# Patient Record
Sex: MALE | Race: WHITE | Employment: FULL TIME | URBAN - METROPOLITAN AREA
[De-identification: names, ages, dates, MRNs, and addresses within clinical notes are randomized per-mention and may not be internally consistent; named-entity substitution may affect disease eponyms.]

---

## 2019-09-17 ENCOUNTER — OFFICE VISIT (OUTPATIENT)
Dept: ENDOCRINOLOGY | Age: 49
End: 2019-09-17
Payer: COMMERCIAL

## 2019-09-17 VITALS
DIASTOLIC BLOOD PRESSURE: 82 MMHG | RESPIRATION RATE: 16 BRPM | OXYGEN SATURATION: 98 % | WEIGHT: 257.2 LBS | HEIGHT: 68 IN | BODY MASS INDEX: 38.98 KG/M2 | HEART RATE: 68 BPM | SYSTOLIC BLOOD PRESSURE: 128 MMHG

## 2019-09-17 DIAGNOSIS — Z86.39 H/O DIABETES MELLITUS: Primary | ICD-10-CM

## 2019-09-17 DIAGNOSIS — R73.03 PREDIABETES: ICD-10-CM

## 2019-09-17 LAB — HBA1C MFR BLD: 6.3 %

## 2019-09-17 PROCEDURE — 99204 OFFICE O/P NEW MOD 45 MIN: CPT | Performed by: INTERNAL MEDICINE

## 2019-09-17 PROCEDURE — 83036 HEMOGLOBIN GLYCOSYLATED A1C: CPT | Performed by: INTERNAL MEDICINE

## 2019-09-17 RX ORDER — ATORVASTATIN CALCIUM 40 MG/1
40 TABLET, FILM COATED ORAL DAILY
COMMUNITY

## 2019-09-17 SDOH — HEALTH STABILITY: PHYSICAL HEALTH: ON AVERAGE, HOW MANY MINUTES DO YOU ENGAGE IN EXERCISE AT THIS LEVEL?: 30 MIN

## 2019-09-17 SDOH — HEALTH STABILITY: PHYSICAL HEALTH: ON AVERAGE, HOW MANY DAYS PER WEEK DO YOU ENGAGE IN MODERATE TO STRENUOUS EXERCISE (LIKE A BRISK WALK)?: 6 DAYS

## 2019-09-17 NOTE — PROGRESS NOTES
Nephropathy or Neuropathy   Macrovascular complications: history of CAD s/p stent placement in August/2018, no PVD or Stroke  The patient has not received Flushot or Pneumonia vaccination      PAST MEDICAL HISTORY   Past Medical History:   Diagnosis Date    CAD S/P percutaneous coronary angioplasty     Hyperlipidemia     Hypertension     Type 2 diabetes mellitus ([facility] [address]      PAST SURGICAL HISTORY   Past Surgical History:   Procedure Laterality Date    APPENDECTOMY  1993     SOCIAL HISTORY   Social History     Socioeconomic History    Marital status:      Spouse name: Not on file    Number of children: Not on file    Years of education: Not on file    Highest education level: Not on file   Occupational History    Not on file   Social Needs    Financial resource strain: Not on file    Food insecurity:     Worry: Not on file     Inability: Not on file    Transportation needs:     Medical: Not on file     Non-medical: Not on file   Tobacco Use    Smoking status: Never Smoker    Smokeless tobacco: Never Used   Substance and Sexual Activity    Alcohol use:  Yes     Alcohol/week: 0.0 standard drinks     Comment: occassionally    Drug use: No    Sexual activity: Not on file   Lifestyle    Physical activity:     Days per week: 6 days     Minutes per session: 30 min    Stress: Not on file   Relationships    Social connections:     Talks on phone: Not on file     Gets together: Not on file     Attends [employer] service: Not on file     Active member of club or organization: Not on file     Attends meetings of clubs or organizations: Not on file     Relationship status: Not on file    Intimate partner violence:     Fear of current or ex partner: Not on file     Emotionally abused: Not on file     Physically abused: Not on file     Forced sexual activity: Not on file   Other Topics Concern    Not on file   Social History Narrative    Not on file     FAMILY HISTORY   Family History   Problem Relation male seen in for the following issues     Diabetes Mellitus Type 2     · Patient's diabetes is controlled   · Will continue lifestyle modification   · The patient was advised to check blood sugars 4 times a day before meals and at bedtime and send BS readings to our office in a week. · Discussed with patient A1c and blood sugar goals   · Optimal blood sugars: 100-140 pre-prandial, < 180 peak post-prandial  · The patient counseled about the complications of uncontrolled diabetes   · Patient was counselled about the importance of self-blood glucose monitoring and eating consistent carb diet to avoid blood sugar fluctuations   · Patient will need routine diabetes maintenance and prevention  · The patient was referred to diabetic educator for further teaching   · Discussed lifestyle changes including diet and exercise with patient; recommended 150 minutes of moderate intensity exercise per week. · Diabetes labs before next visit       Obesity   Discussed lifestyle changes including diet and exercise with patient in depth. Also discussed with patient cardiovascular risk associated with obesity    Return in about 4 months (around 1/17/2020) for Prediabetes . The above issues were reviewed with the patient who understood and agreed with the plan. Thank you for allowing us to participate in the care of this patient. Please do not hesitate to contact us with any additional questions.      Chase Infante MD PGY-2  Internal medicine    Attending:  Anna Gutierrez MD  Endocrinologist, [facility] and Endocrinology   [address] [place] [address]   Phone: [phone]  Fax: [phone]  --------------------------------------------  Electronically signed by Sujata Santillan MD

## 2019-10-28 ENCOUNTER — HOSPITAL ENCOUNTER (OUTPATIENT)
Dept: DIABETES SERVICES | Age: 49
Setting detail: THERAPIES SERIES
Discharge: HOME | End: 2019-10-28
Payer: COMMERCIAL

## 2019-10-28 PROCEDURE — G0109 DIAB MANAGE TRN IND/GROUP: HCPCS

## 2019-10-29 ENCOUNTER — HOSPITAL ENCOUNTER (OUTPATIENT)
Dept: DIABETES SERVICES | Age: 49
Setting detail: THERAPIES SERIES
Discharge: HOME | End: 2019-10-29
Payer: COMMERCIAL

## 2019-10-29 PROCEDURE — G0109 DIAB MANAGE TRN IND/GROUP: HCPCS | Performed by: DIETITIAN, REGISTERED

## 2019-10-30 SDOH — ECONOMIC STABILITY: FOOD INSECURITY: ADDITIONAL INFORMATION: NO

## 2019-10-30 ASSESSMENT — PATIENT HEALTH QUESTIONNAIRE - PHQ9
SUM OF ALL RESPONSES TO PHQ QUESTIONS 1-9: 4
1. LITTLE INTEREST OR PLEASURE IN DOING THINGS: 1
SUM OF ALL RESPONSES TO PHQ9 QUESTIONS 1 & 2: 2
SUM OF ALL RESPONSES TO PHQ QUESTIONS 1-9: 4
2. FEELING DOWN, DEPRESSED OR HOPELESS: 1

## 2020-07-19 ENCOUNTER — APPOINTMENT (OUTPATIENT)
Dept: GENERAL RADIOLOGY | Age: 50
End: 2020-07-19
Payer: COMMERCIAL

## 2020-07-19 ENCOUNTER — HOSPITAL ENCOUNTER (EMERGENCY)
Age: 50
Discharge: HOME | End: 2020-07-19
Payer: COMMERCIAL

## 2020-07-19 VITALS
HEART RATE: 80 BPM | DIASTOLIC BLOOD PRESSURE: 87 MMHG | TEMPERATURE: 97.9 F | BODY MASS INDEX: 40.16 KG/M2 | RESPIRATION RATE: 14 BRPM | OXYGEN SATURATION: 96 % | SYSTOLIC BLOOD PRESSURE: 153 MMHG | HEIGHT: 68 IN | WEIGHT: 265 LBS

## 2020-07-19 PROCEDURE — 99283 EMERGENCY DEPT VISIT LOW MDM: CPT

## 2020-07-19 PROCEDURE — 6370000000 HC RX 637 (ALT 250 FOR IP): Performed by: PHYSICIAN ASSISTANT

## 2020-07-19 PROCEDURE — 73610 X-RAY EXAM OF ANKLE: CPT

## 2020-07-19 RX ORDER — IBUPROFEN 800 MG/1
800 TABLET ORAL ONCE
Status: COMPLETED | OUTPATIENT
Start: 2020-07-19 | End: 2020-07-19

## 2020-07-19 RX ADMIN — IBUPROFEN 800 MG: 800 TABLET, FILM COATED ORAL at 19:12

## 2020-07-19 ASSESSMENT — PAIN DESCRIPTION - DESCRIPTORS: DESCRIPTORS: CONSTANT;NUMBNESS

## 2020-07-19 ASSESSMENT — PAIN DESCRIPTION - ORIENTATION: ORIENTATION: RIGHT

## 2020-07-19 ASSESSMENT — PAIN SCALES - GENERAL
PAINLEVEL_OUTOF10: 8
PAINLEVEL_OUTOF10: 8

## 2020-07-19 ASSESSMENT — PAIN DESCRIPTION - FREQUENCY: FREQUENCY: CONTINUOUS

## 2020-07-19 ASSESSMENT — PAIN DESCRIPTION - PAIN TYPE: TYPE: ACUTE PAIN

## 2020-07-19 ASSESSMENT — PAIN DESCRIPTION - LOCATION: LOCATION: ANKLE

## 2020-07-20 NOTE — ED PROVIDER NOTES
[facility]       Department of Emergency Medicine   ED  Provider Note  Admit Date/RoomTime: 7/19/2020  6:49 PM  ED Room: 33/33  Chief Complaint:   Ankle Pain (Right ankle. Pt states was playing disc golf and rolled ankle, causing fall. [place] crack. Denies head injury, thinners. Pt reports left hamstring pain.)    History of Present Illness   Source of history provided by:  patient. History/Exam Limitations: none. Michael Becerril is a 47 y.o. old male presenting to the emergency department by private vehicle, for Right ankle pain which occured 2 hour(s) prior to arrival.  Cause of complaint: sports injury, twisting injury while playing disc golf. There has been a history of no prior problems with this area in the past.  Since onset the symptoms have been moderate in degree with ability to bear weight, but with some pain. His pain is aggraveated by standing, walking and relieved by OTC NSAIDS and rest.  Tetanus Status: up to date. Patient reports he was doing his disc throw when he thought he tore his left hamstring which caused him to kind of leap up in pain and when he did this he twisted his right ankle. ROS    Pertinent positives and negatives are stated within HPI, all other systems reviewed and are negative. Past Surgical History:   Procedure Laterality Date    APPENDECTOMY  1993   Social History:  reports that he has never smoked. He has never used smokeless tobacco. He reports current alcohol use. He reports that he does not use drugs. Family History: family history includes Kidney Disease in his father; Barnetta Soren in his brother; Other in his father and sister; Thyroid Disease in his sister. Allergies: Patient has no known allergies.     Physical Exam           ED Triage Vitals   BP Temp Temp src Pulse Resp SpO2 Height Weight   07/19/20 1848 07/19/20 1843 -- 07/19/20 1843 07/19/20 1843 07/19/20 1843 07/19/20 1841 07/19/20 1841   (!) 153/87 97.9 °F (36.6 °C)  80 14 96 % 5' 8\" (1.727 m) 265 lb (120.2 kg)       Oxygen Saturation Interpretation: Normal.    Constitutional:  Alert, development consistent with age. Neck:  Normal ROM. Supple. Ankle:  Right Lateral:              Tenderness:  moderate. Swelling: Moderate. Deformity: no.             ROM: full range with pain. Skin:  no erythema, rash or wounds noted. Neurovascular: Motor deficit: none. Sensory deficit:   none. Pulse deficit: none. Capillary refill: normal.  Knee:              Tenderness:  none. Swelling: None. Deformity: no.             ROM: full range of motion. Skin:  no erythema, rash or wounds noted. Foot:              Tenderness:  none. Swelling: None. Deformity: no.             ROM: full range of motion. Skin:  no erythema, rash or wounds noted. Gait:  limp. Lymphatics: No lymphangitis or adenopathy noted. Neurological:  Oriented. Motor functions intact. Lab / Imaging Results   (All laboratory and radiology results have been personally reviewed by myself)  Labs:  No results found for this visit on 07/19/20. Imaging: All Radiology results interpreted by Radiologist unless otherwise noted. XR ANKLE RIGHT (MIN 3 VIEWS)   Final Result      No fracture or joint dislocation. ED Course / Medical Decision Making     Medications   ibuprofen (ADVIL;MOTRIN) tablet 800 mg (800 mg Oral Given 7/19/20 1912)        Consults:   None    Procedure(s):   none    MDM:       Films were obtained based on moderate suspicion for bony injury as per history/physical findings . Plan is subsequently for symptom control, limited use and  immobilization with appropriate outpatient follow-up. Patient advised to rest and ice and ibuprofen also for the torn hamstring. Follow-up with primary care as needed.   Patient is on vacation this week so he does not need a work note.    Counseling: The emergency provider has spoken with the patient and discussed todays results, in addition to providing specific details for the plan of care and counseling regarding the diagnosis and prognosis. Questions are answered at this time and they are agreeable with the plan. Assessment      1. Sprain of right ankle, unspecified ligament, initial encounter      Plan   Discharge to home  Patient condition is good    New Medications     Discharge Medication List as of 7/19/2020  8:05 PM        Electronically signed by Otoniel Pollard PA-C   DD: 7/20/20  **This report was transcribed using voice recognition software. Every effort was made to ensure accuracy; however, inadvertent computerized transcription errors may be present.   END OF ED PROVIDER NOTE       Otoniel Pollard PA-C  07/20/20 [id number]

## 2022-03-28 ENCOUNTER — OFFICE VISIT (OUTPATIENT)
Dept: ENDOCRINOLOGY | Age: 52
End: 2022-03-28
Payer: COMMERCIAL

## 2022-03-28 VITALS
BODY MASS INDEX: 39.71 KG/M2 | HEIGHT: 68 IN | HEART RATE: 61 BPM | WEIGHT: 262 LBS | DIASTOLIC BLOOD PRESSURE: 81 MMHG | SYSTOLIC BLOOD PRESSURE: 125 MMHG

## 2022-03-28 DIAGNOSIS — E78.2 MIXED HYPERLIPIDEMIA: ICD-10-CM

## 2022-03-28 DIAGNOSIS — E11.65 UNCONTROLLED TYPE 2 DIABETES MELLITUS WITH HYPERGLYCEMIA (HCC): Primary | ICD-10-CM

## 2022-03-28 DIAGNOSIS — E66.01 CLASS 2 SEVERE OBESITY DUE TO EXCESS CALORIES WITH SERIOUS COMORBIDITY AND BODY MASS INDEX (BMI) OF 39.0 TO 39.9 IN ADULT (HCC): ICD-10-CM

## 2022-03-28 DIAGNOSIS — E11.65 UNCONTROLLED TYPE 2 DIABETES MELLITUS WITH HYPERGLYCEMIA (HCC): ICD-10-CM

## 2022-03-28 DIAGNOSIS — E55.9 VITAMIN D DEFICIENCY: ICD-10-CM

## 2022-03-28 LAB
ALBUMIN SERPL-MCNC: 4.8 G/DL (ref 3.5–5.2)
ALP BLD-CCNC: 134 U/L (ref 40–129)
ALT SERPL-CCNC: 47 U/L (ref 0–40)
ANION GAP SERPL CALCULATED.3IONS-SCNC: 16 MMOL/L (ref 7–16)
AST SERPL-CCNC: 27 U/L (ref 0–39)
BILIRUB SERPL-MCNC: 0.6 MG/DL (ref 0–1.2)
BUN BLDV-MCNC: 12 MG/DL (ref 6–20)
CALCIUM SERPL-MCNC: 9.7 MG/DL (ref 8.6–10.2)
CHLORIDE BLD-SCNC: 102 MMOL/L (ref 98–107)
CHOLESTEROL, TOTAL: 177 MG/DL (ref 0–199)
CO2: 23 MMOL/L (ref 22–29)
CREAT SERPL-MCNC: 0.8 MG/DL (ref 0.7–1.2)
CREATININE URINE: 114 MG/DL (ref 40–278)
GFR AFRICAN AMERICAN: >60
GFR NON-AFRICAN AMERICAN: >60 ML/MIN/1.73
GLUCOSE BLD-MCNC: 255 MG/DL (ref 74–99)
HBA1C MFR BLD: 9.2 %
HDLC SERPL-MCNC: 46 MG/DL
LDL CHOLESTEROL CALCULATED: 92 MG/DL (ref 0–99)
MICROALBUMIN UR-MCNC: 13.8 MG/L
MICROALBUMIN/CREAT UR-RTO: 12.1 (ref 0–30)
POTASSIUM SERPL-SCNC: 4.7 MMOL/L (ref 3.5–5)
SODIUM BLD-SCNC: 141 MMOL/L (ref 132–146)
TOTAL PROTEIN: 8 G/DL (ref 6.4–8.3)
TRIGL SERPL-MCNC: 197 MG/DL (ref 0–149)
TSH SERPL DL<=0.05 MIU/L-ACNC: 2.36 UIU/ML (ref 0.27–4.2)
VITAMIN D 25-HYDROXY: 19 NG/ML (ref 30–100)
VLDLC SERPL CALC-MCNC: 39 MG/DL

## 2022-03-28 PROCEDURE — 99214 OFFICE O/P EST MOD 30 MIN: CPT | Performed by: CLINICAL NURSE SPECIALIST

## 2022-03-28 PROCEDURE — 83036 HEMOGLOBIN GLYCOSYLATED A1C: CPT | Performed by: CLINICAL NURSE SPECIALIST

## 2022-03-28 PROCEDURE — 3046F HEMOGLOBIN A1C LEVEL >9.0%: CPT | Performed by: CLINICAL NURSE SPECIALIST

## 2022-03-28 RX ORDER — BLOOD SUGAR DIAGNOSTIC
1 STRIP MISCELLANEOUS DAILY
Qty: 100 EACH | Refills: 3 | Status: SHIPPED | OUTPATIENT
Start: 2022-03-28

## 2022-03-28 RX ORDER — LISINOPRIL 10 MG/1
TABLET ORAL
COMMUNITY
Start: 2022-02-01

## 2022-03-28 RX ORDER — SEMAGLUTIDE 1.34 MG/ML
INJECTION, SOLUTION SUBCUTANEOUS
Qty: 3 PEN | Refills: 3 | Status: SHIPPED | OUTPATIENT
Start: 2022-03-28

## 2022-03-28 RX ORDER — METOPROLOL SUCCINATE 25 MG/1
TABLET, EXTENDED RELEASE ORAL
COMMUNITY
Start: 2022-01-24

## 2022-03-28 RX ORDER — LANCETS
EACH MISCELLANEOUS
Qty: 100 EACH | Refills: 3 | Status: SHIPPED | OUTPATIENT
Start: 2022-03-28

## 2022-03-28 NOTE — PROGRESS NOTES
[address] Department of Endocrinology Diabetes and Metabolism   [address] [facility] [address]   Phone: [phone]  Fax: [phone]    Date of Service: 3/28/2022    Primary Care Physician: Flavia Friday, DO  Referring physician: Vannesa Alba DO  Provider: Joel York APRN - CNS     Reason for the visit:      History of Present Illness: The history is provided by the patient. No  was used. Accuracy of the patient data is excellent. Kristopher Cline is a very pleasant 45 y.o. male seen today for diabetes management     Dx with dm in 2017   Context: Dx on BW   Medications :  Was on metformin but caused pt to feel  lethargic   Has not been on any DM medication since that time   He was doing well with diet but hba1c has recently worsened   Has not checked BG recently     Lab Results   Component Value Date    LABA1C 9.2 03/28/2022    LABA1C 6.3 09/17/2019       The patient has been mindful of what has been eating and following diabetic diet as encouraged  I reviewed current medications and the patient is currently not on any diabetic medication. The patient is due for an eye exam. Last eye exam was a few years ago no h/o diabetic retinopathy. The patient has not seen any podiatrist in the past.   Microvascular complications:  No Retinopathy, Nephropathy or Neuropathy   Macrovascular complications: history of CAD s/p stent placement in August/2018, no PVD or Stroke  The patient has not received Flushot or Pneumonia vaccination  PAST MEDICAL HISTORY   Past Medical History:   Diagnosis Date    CAD S/P percutaneous coronary angioplasty     Hyperlipidemia     Hypertension     Type 2 diabetes mellitus ([address]        PAST SURGICAL HISTORY   Past Surgical History:   Procedure Laterality Date    APPENDECTOMY  1993       SOCIAL HISTORY   Tobacco:   reports that he has never smoked.  He has never used smokeless tobacco.  Alcohol:   reports current alcohol use.  Drugs:   reports no history of drug use. FAMILY HISTORY   Family History   Problem Relation Age of Onset    Other Father         leukemia,     Kidney Disease Father     Lung Cancer Brother     Other Sister         chrohns disease    Thyroid Disease Sister        ALLERGIES AND DRUG REACTIONS   No Known Allergies    CURRENT MEDICATIONS   Current Outpatient Medications   Medication Sig Dispense Refill    lisinopril (PRINIVIL;ZESTRIL) 10 MG tablet TAKE 1 TABLET BY MOUTH EVERY DAY      metoprolol succinate (TOPROL XL) 25 MG extended release tablet TAKE 1 TABLET BY MOUTH EVERY DAY      Semaglutide,0.25 or 0.5MG/DOS, (OZEMPIC, 0.25 OR 0.5 MG/DOSE,) 2 MG/1.5ML SOPN 0.5 mg once weekly subcutaneous 3 pen 3    atorvastatin (LIPITOR) 40 MG tablet Take 40 mg by mouth daily      aspirin 81 MG tablet Take 81 mg by mouth daily       No current facility-administered medications for this visit. Review of Systems  Constitutional: No fever, no chills, no diaphoresis, no generalized weakness. HEENT: No blurred vision, No sore throat, no ear pain, no hair loss  Neck: denied any neck swelling, difficulty swallowing,   Cardio-pulmonary: No CP, SOB or palpitation, No orthopnea or PND. No cough or wheezing. GI: No N/V/D, no constipation, No abdominal pain, no melena or hematochezia   : Denied any dysuria, hematuria, flank pain, discharge, or incontinence. Skin: denied any rash, ulcer, Hirsute, or hyperpigmentation. MSK: denied any joint deformity, joint pain/swelling, muscle pain, or back pain.   Neuro: no numbness, no tingling, no weakness, _    OBJECTIVE    /81   Pulse 61   Ht 5' 8\" (1.727 m)   Wt 262 lb (118.8 kg)   BMI 39.84 kg/m²   BP Readings from Last 4 Encounters:   03/28/22 125/81   07/19/20 (!) 153/87   09/17/19 128/82   01/08/16 130/80     Wt Readings from Last 6 Encounters:   03/28/22 262 lb (118.8 kg)   07/19/20 265 lb (120.2 kg)   09/17/19 257 lb 3.2 oz (116.7 kg)   01/08/16 257 lb (116.6 kg)   12/04/15 255 lb (115.7 kg)   11/20/15 250 lb (113.4 kg)       Physical examination:  General: awake alert, oriented x3, no abnormal position or movements. HEENT: normocephalic non-traumatic, no exophthalmos   Neck: supple, no LN enlargement, no thyromegaly, no thyroid tenderness, no JVD. Pulm: Clear equal air entry no added sounds, no wheezing or rhonchi    CVS: S1 + S2, no murmur, no heave. Dorsalis pedis pulse palpable   Abd: soft lax, no tenderness, no organomegaly, audible bowel sounds. Skin: warm, no lesions, no rash.  No callus, no Ulcers, No acanthosis nigricans  Musculoskeletal: No back tenderness, no kyphosis/scoliosis    Neuro: CN intact, Monofilament sensation normal bilateral , muscle power normal  Psych: normal mood, and affect      Review of Laboratory Data:  I personally reviewed the following lab:  Lab Results   Component Value Date/Time    WBC 5.6 11/20/2015 12:00 PM    RBC 4.73 11/20/2015 12:00 PM    HGB 15.0 11/20/2015 12:00 PM    HCT 43.8 11/20/2015 12:00 PM    MCV 92.5 11/20/2015 12:00 PM    MCH 31.7 11/20/2015 12:00 PM    MCHC 34.3 11/20/2015 12:00 PM    RDW 12.7 11/20/2015 12:00 PM     11/20/2015 12:00 PM    MPV 8.9 11/20/2015 12:00 PM      Lab Results   Component Value Date/Time     01/08/2016 12:00 PM    K 4.9 01/08/2016 12:00 PM    CO2 22 01/08/2016 12:00 PM    BUN 12 01/08/2016 12:00 PM    CREATININE 1.0 01/08/2016 12:00 PM    CALCIUM 9.8 01/08/2016 12:00 PM    LABGLOM >60 01/08/2016 12:00 PM    GFRAA >60 01/08/2016 12:00 PM      Lab Results   Component Value Date/Time    TSH 1.730 11/20/2015 12:00 PM     Lab Results   Component Value Date    LABA1C 9.2 03/28/2022    GLUCOSE 105 01/08/2016    GLUCOSE 89 02/09/2012     Lab Results   Component Value Date    LABA1C 9.2 03/28/2022    LABA1C 6.3 09/17/2019     Lab Results   Component Value Date    TRIG 96 01/08/2016    HDL 44 01/08/2016    LDLCALC 101 01/08/2016    CHOL 164 01/08/2016     No results found for: [address], a 45 y.o.-old male seen in for the following issues       Assessment:      Diagnosis Orders   1. Uncontrolled type 2 diabetes mellitus with hyperglycemia (HCC)  Comprehensive Metabolic Panel    Lipid Panel    Microalbumin / Creatinine Urine Ratio    TSH   2. Class 2 severe obesity due to excess calories with serious comorbidity and body mass index (BMI) of 39.0 to 39.9 in adult ([facility] [address]     3. Mixed hyperlipidemia     4. Vitamin D deficiency  Vitamin D 25 Hydroxy       Plan:     1. Uncontrolled type 2 diabetes mellitus with hyperglycemia ([facility]   · Patient's diabetes is uncontrolled per hemoglobin A1c. Hemoglobin A1c 9.2%  · Patient cannot tolerate metformin in the past  · We will start Ozempic 0.25 mg for 4 weeks then increase to 0.5 mg thereafter  · No contraindications. Counseled on side effects  · The patient was advised to check blood sugars daily alternating times fasting and before dinner  · Discussed with patient A1c and blood sugar goals   · Optimal blood sugars: 100-140 pre-prandial, < 180 peak post-prandial  · The patient counseled about the complications of uncontrolled diabetes   · Discussed lifestyle changes including diet and exercise with patient; recommended 150 minutes of moderate intensity exercise per week. · Diabetes labs ordered   2. Class 2 severe obesity due to excess calories with serious comorbidity and body mass index (BMI) of 39.0 to 39.9 in adult [facility])  ·  Discussed lifestyle changes including diet and exercise with patient in depth. Also discussed with patient cardiovascular risk associated with obesity   3. Mixed hyperlipidemia   · Continue atorvastatin. Counseled on the importance of statin therapy with diabetes   4. Vitamin D deficiency   Patient risk for vitamin D deficiency. Will assess vitamin D.   Counseled on the importance of vitamin D and bone health       I personally spent greater than 30 minutes reviewing external notes from PCP and other patient's care team providers, and personally interpreted labs associated with the above diagnosis. I also ordered labs to further assess and manage the above addressed medical conditions. Return in about 6 weeks (around 5/9/2022). The above issues were reviewed with the patient who understood and agreed with the plan. Thank you for allowing us to participate in the care of this patient. Please do not hesitate to contact us with any additional questions. KYARA Palacios     [facility] Diabetes Care and Endocrinology   [address] [place] [address]   Phone: [phone]  Fax: [phone]  --------------------------------------------  An electronic signature was used to authenticate this note.  KYARA Palacios on 3/28/2022 at 10:00 AM

## 2022-03-29 RX ORDER — ERGOCALCIFEROL (VITAMIN D2) 1250 MCG
50000 CAPSULE ORAL WEEKLY
Qty: 8 CAPSULE | Refills: 0 | Status: SHIPPED
Start: 2022-03-29 | End: 2022-08-18 | Stop reason: ALTCHOICE

## 2022-03-29 RX ORDER — ERGOCALCIFEROL 1.25 MG/1
CAPSULE ORAL
Qty: 12 CAPSULE | OUTPATIENT
Start: 2022-03-29

## 2022-03-30 ENCOUNTER — TELEPHONE (OUTPATIENT)
Dept: ENDOCRINOLOGY | Age: 52
End: 2022-03-30

## 2022-03-30 DIAGNOSIS — E55.9 VITAMIN D DEFICIENCY: Primary | ICD-10-CM

## 2022-03-30 NOTE — TELEPHONE ENCOUNTER
----- Message from KYARA Ashby sent at 3/29/2022  8:23 AM EDT -----  Please call patient and inform him vitamin D is low. Please have patient start Drisdol 50,000 IU once weekly for 8 weeks then patient can start vitamin D over-the-counter 2000 IU daily thereafter. Triglycerides mildly elevated should decrease with better blood glucose control.   Encourage diet and exercise

## 2022-03-30 NOTE — TELEPHONE ENCOUNTER
Called, spoke to patient and gave results and recommendations/instructions  Pt stated vit d not covered but bought 4 tablets.  Sent new script for different vit d

## 2022-05-08 DIAGNOSIS — E55.9 VITAMIN D DEFICIENCY: ICD-10-CM

## 2022-05-09 RX ORDER — CHOLECALCIFEROL (VITAMIN D3) 1250 MCG
CAPSULE ORAL
Qty: 4 CAPSULE | Refills: 0 | OUTPATIENT
Start: 2022-05-09

## 2022-05-11 ENCOUNTER — OFFICE VISIT (OUTPATIENT)
Dept: ENDOCRINOLOGY | Age: 52
End: 2022-05-11
Payer: COMMERCIAL

## 2022-05-11 VITALS
BODY MASS INDEX: 38.19 KG/M2 | HEART RATE: 73 BPM | RESPIRATION RATE: 18 BRPM | WEIGHT: 252 LBS | OXYGEN SATURATION: 99 % | SYSTOLIC BLOOD PRESSURE: 109 MMHG | DIASTOLIC BLOOD PRESSURE: 77 MMHG | HEIGHT: 68 IN

## 2022-05-11 DIAGNOSIS — E66.01 CLASS 2 SEVERE OBESITY DUE TO EXCESS CALORIES WITH SERIOUS COMORBIDITY AND BODY MASS INDEX (BMI) OF 39.0 TO 39.9 IN ADULT (HCC): ICD-10-CM

## 2022-05-11 DIAGNOSIS — E78.2 MIXED HYPERLIPIDEMIA: ICD-10-CM

## 2022-05-11 DIAGNOSIS — E55.9 VITAMIN D DEFICIENCY: ICD-10-CM

## 2022-05-11 DIAGNOSIS — E11.65 UNCONTROLLED TYPE 2 DIABETES MELLITUS WITH HYPERGLYCEMIA (HCC): Primary | ICD-10-CM

## 2022-05-11 PROCEDURE — 99214 OFFICE O/P EST MOD 30 MIN: CPT | Performed by: NURSE PRACTITIONER

## 2022-05-11 PROCEDURE — 3046F HEMOGLOBIN A1C LEVEL >9.0%: CPT | Performed by: NURSE PRACTITIONER

## 2022-05-11 RX ORDER — SEMAGLUTIDE 1.34 MG/ML
INJECTION, SOLUTION SUBCUTANEOUS
Qty: 3 ML | Refills: 3 | Status: SHIPPED | OUTPATIENT
Start: 2022-05-11

## 2022-05-11 NOTE — PROGRESS NOTES
[address] [facility] Department of Endocrinology Diabetes and Metabolism   [address] [facility] [address] [address]   Phone: [phone]  Fax: [phone]    Date of Service: 5/11/2022    Primary Care Physician: Emily Rubin DO  Referring physician: No ref. provider found  Provider: KYRAA Santos NP     Reason for the visit:    Type 2 DM     History of Present Illness: The history is provided by the patient. No  was used. Accuracy of the patient data is excellent. Luis Armando Porter is a very pleasant 45 y.o. male seen today for diabetes management     Dx with DM in 2017 in . Currently on Ozempic 0.5 weekly, admits to some GI issues with diarrhea and early satiety. Has lost 10 lb since last OV    Hx of previous metformin use but caused pt to feel  lethargic     Fingerstick checks have been improving, he did lose his glucometer for about week but did find it  He did forget his glucometer   Per recall FBS are now under 200    Lab Results   Component Value Date    LABA1C 9.2 03/28/2022    LABA1C 6.3 09/17/2019     He has had no hypoglycemic events  The patient has been mindful of what has been eating and following diabetic diet as encouraged  I reviewed current medications and the patient is currently not on any diabetic medication. The patient is due for an eye exam. Last eye exam was a few years ago no h/o diabetic retinopathy.   The patient has not seen any podiatrist in the past.   Microvascular complications:  No Retinopathy, Nephropathy or Neuropathy   Macrovascular complications: history of CAD s/p stent placement in August 2018, no PVD or Stroke  The patient has not received Flushot or Pneumonia vaccination    PAST MEDICAL HISTORY   Past Medical History:   Diagnosis Date    CAD S/P percutaneous coronary angioplasty     Hyperlipidemia     Hypertension     Type 2 diabetes mellitus ([address]        PAST SURGICAL HISTORY   Past Surgical History:   Procedure Laterality Date    APPENDECTOMY  1993       SOCIAL HISTORY   Tobacco:   reports that he has never smoked. He has never used smokeless tobacco.  Alcohol:   reports current alcohol use. Drugs:   reports no history of drug use. FAMILY HISTORY   Family History   Problem Relation Age of Onset    Other Father         leukemia,     Kidney Disease Father     Lung Cancer Brother     Other Sister         chrohns disease    Thyroid Disease Sister        ALLERGIES AND DRUG REACTIONS   No Known Allergies    CURRENT MEDICATIONS   Current Outpatient Medications   Medication Sig Dispense Refill    Semaglutide, 1 MG/DOSE, (OZEMPIC, 1 MG/DOSE,) 4 MG/3ML SOPN Administer 1 mg subq weekly 3 mL 3    vitamin D (CHOLECALCIFEROL) 12789 UNIT CAPS Take 1 capsule by mouth once a week 4 capsule 0    ergocalciferol (DRISDOL) 1.25 MG (22222 UT) capsule Take 1 capsule by mouth once a week 8 capsule 0    lisinopril (PRINIVIL;ZESTRIL) 10 MG tablet TAKE 1 TABLET BY MOUTH EVERY DAY      metoprolol succinate (TOPROL XL) 25 MG extended release tablet TAKE 1 TABLET BY MOUTH EVERY DAY      Semaglutide,0.25 or 0.5MG/DOS, (OZEMPIC, 0.25 OR 0.5 MG/DOSE,) 2 MG/1.5ML SOPN 0.5 mg once weekly subcutaneous 3 pen 3    blood glucose test strips (ACCU-CHEK GUIDE) strip 1 each by In Vitro route daily As needed. 100 each 3    Accu-Chek FastClix Lancets MISC To check blood sugar daily 100 each 3    atorvastatin (LIPITOR) 40 MG tablet Take 40 mg by mouth daily      aspirin 81 MG tablet Take 81 mg by mouth daily       No current facility-administered medications for this visit. Review of Systems  Constitutional: No fever, no chills, no diaphoresis, no generalized weakness. HEENT: No blurred vision, No sore throat, no ear pain, no hair loss  Neck: denied any neck swelling, difficulty swallowing,   Cardio-pulmonary: No CP, SOB or palpitation, No orthopnea or PND. No cough or wheezing.   GI: No N/V/D, no constipation, No abdominal pain, no melena or hematochezia   : Denied any dysuria, hematuria, flank pain, discharge, or incontinence. Skin: denied any rash, ulcer, Hirsute, or hyperpigmentation. MSK: denied any joint deformity, joint pain/swelling, muscle pain, or back pain. Neuro: no numbness, no tingling, no weakness    OBJECTIVE    /77   Pulse 73   Resp 18   Ht 5' 8\" (1.727 m)   Wt 252 lb (114.3 kg)   SpO2 99%   BMI 38.32 kg/m²   BP Readings from Last 4 Encounters:   05/11/22 109/77   03/28/22 125/81   07/19/20 (!) 153/87   09/17/19 128/82     Wt Readings from Last 6 Encounters:   05/11/22 252 lb (114.3 kg)   03/28/22 262 lb (118.8 kg)   07/19/20 265 lb (120.2 kg)   09/17/19 257 lb 3.2 oz (116.7 kg)   01/08/16 257 lb (116.6 kg)   12/04/15 255 lb (115.7 kg)       Physical examination:  General: awake alert, oriented x3, no abnormal position or movements. HEENT: normocephalic non-traumatic, no exophthalmos   Neck: supple, no LN enlargement, no thyromegaly, no thyroid tenderness, no JVD. Pulm: Clear equal air entry no added sounds, no wheezing or rhonchi    CVS: S1 + S2, no murmur, no heave. Dorsalis pedis pulse palpable   Abd: soft lax, no tenderness, no organomegaly, audible bowel sounds. Skin: warm, no lesions, no rash.  No callus, no Ulcers, No acanthosis nigricans  Musculoskeletal: No back tenderness, no kyphosis/scoliosis    Neuro: CN intact,  sensation normal bilateral , muscle power normal  Psych: normal mood, and affect      Review of Laboratory Data:  I personally reviewed the following lab:  Lab Results   Component Value Date/Time    WBC 5.6 11/20/2015 12:00 PM    RBC 4.73 11/20/2015 12:00 PM    HGB 15.0 11/20/2015 12:00 PM    HCT 43.8 11/20/2015 12:00 PM    MCV 92.5 11/20/2015 12:00 PM    MCH 31.7 11/20/2015 12:00 PM    MCHC 34.3 11/20/2015 12:00 PM    RDW 12.7 11/20/2015 12:00 PM     11/20/2015 12:00 PM    MPV 8.9 11/20/2015 12:00 PM      Lab Results   Component Value Date/Time     03/28/2022 10:03 AM    K 4.7 03/28/2022 10:03 AM    CO2 23 03/28/2022 10:03 AM    BUN 12 03/28/2022 10:03 AM    CREATININE 0.8 03/28/2022 10:03 AM    CALCIUM 9.7 03/28/2022 10:03 AM    LABGLOM >60 03/28/2022 10:03 AM    GFRAA >60 03/28/2022 10:03 AM      Lab Results   Component Value Date/Time    TSH 2.360 03/28/2022 10:03 AM     Lab Results   Component Value Date    LABA1C 9.2 03/28/2022    GLUCOSE 255 03/28/2022    GLUCOSE 89 02/09/2012    MALBCR 12.1 03/28/2022    LABMICR 13.8 03/28/2022    LABCREA 114 03/28/2022     Lab Results   Component Value Date    LABA1C 9.2 03/28/2022    LABA1C 6.3 09/17/2019     Lab Results   Component Value Date    TRIG 197 03/28/2022    HDL 46 03/28/2022    LDLCALC 92 03/28/2022    CHOL 177 03/28/2022     Lab Results   Component Value Date    VITD25 19 03/28/2022       ASSESSMENT & RECOMMENDATIONS   Sonja Andre, a 45 y.o.-old male seen in for the following issues       Assessment:      Diagnosis Orders   1. Uncontrolled type 2 diabetes mellitus with hyperglycemia (HCC)  Semaglutide, 1 MG/DOSE, (OZEMPIC, 1 MG/DOSE,) 4 MG/3ML SOPN   2. Class 2 severe obesity due to excess calories with serious comorbidity and body mass index (BMI) of 39.0 to 39.9 in adult ([facility]     3. Mixed hyperlipidemia     4. Vitamin D deficiency         Plan:     1. Uncontrolled type 2 diabetes mellitus with hyperglycemia ([facility]   · Patient's diabetes is uncontrolled per hemoglobin A1c. Hemoglobin A1c 9.2%  · Patient cannot tolerate metformin in the past  · Current Ozempic 0.5 mg will increase to 1 mg, if GI affects worsen pt to call  · Will request BS log in 2 weeks, FBS in AM and 3x a week before dinner   · Discussed with patient A1c and blood sugar goals   · Optimal blood sugars: 100-140 pre-prandial, < 180 peak post-prandial  · The patient counseled about the complications of uncontrolled diabetes   · Discussed lifestyle changes including diet and exercise with patient  · A1c due at next OV     2.  Class 2 severe obesity due to excess calories with serious comorbidity and body mass index (BMI) of 39.0 to 39.9 in adult [facility])   ·  Discussed lifestyle changes including diet and exercise with patient in depth. Also discussed with patient cardiovascular risk associated with obesity     3. Mixed hyperlipidemia   · Continue atorvastatin. · Counseled on the importance of statin therapy with diabetes     4. Vitamin D deficiency   · Patient risk for vitamin D deficiency. · Will assess vitamin D.    · Counseled on the importance of vitamin D and bone health       I personally spent greater than 30 minutes reviewing  external notes from PCP and other patient's care team providers, and personally interpreted labs associated with the above diagnosis. I also ordered labs to further assess and manage the above addressed medical conditions. Return in about 3 months (around 8/11/2022) for Type 2 DM. The above issues were reviewed with the patient who understood and agreed with the plan. Thank you for allowing us to participate in the care of this patient. Please do not hesitate to contact us with any additional questions. KYARA Ohara NP [facility] - BEHAVIORAL HEALTH SERVICES Diabetes Care and Endocrinology   [address] [facility] [address]   Phone: [phone]  Fax: [phone]  --------------------------------------------  An electronic signature was used to authenticate this note.  KYARA Ohara NP on 5/11/2022 at 12:49 PM

## 2022-06-08 ENCOUNTER — TELEPHONE (OUTPATIENT)
Dept: ENDOCRINOLOGY | Age: 52
End: 2022-06-08

## 2022-06-08 NOTE — TELEPHONE ENCOUNTER
Aubree Glover called in and stated that the increased dose of Ozempic did increase the GI upset and should he go back to the 0.5mg dose?

## 2022-08-18 ENCOUNTER — OFFICE VISIT (OUTPATIENT)
Dept: ENDOCRINOLOGY | Age: 52
End: 2022-08-18
Payer: COMMERCIAL

## 2022-08-18 VITALS
BODY MASS INDEX: 36.83 KG/M2 | HEIGHT: 68 IN | HEART RATE: 87 BPM | SYSTOLIC BLOOD PRESSURE: 132 MMHG | DIASTOLIC BLOOD PRESSURE: 75 MMHG | WEIGHT: 243 LBS

## 2022-08-18 DIAGNOSIS — E66.01 CLASS 2 SEVERE OBESITY DUE TO EXCESS CALORIES WITH SERIOUS COMORBIDITY AND BODY MASS INDEX (BMI) OF 36.0 TO 36.9 IN ADULT (HCC): ICD-10-CM

## 2022-08-18 DIAGNOSIS — E78.2 MIXED HYPERLIPIDEMIA: ICD-10-CM

## 2022-08-18 DIAGNOSIS — E11.9 TYPE 2 DIABETES MELLITUS WITHOUT COMPLICATION, WITHOUT LONG-TERM CURRENT USE OF INSULIN (HCC): Primary | ICD-10-CM

## 2022-08-18 DIAGNOSIS — E55.9 VITAMIN D DEFICIENCY: ICD-10-CM

## 2022-08-18 LAB
CHOLESTEROL, TOTAL: 142 MG/DL (ref 0–199)
HBA1C MFR BLD: 6.8 %
HDLC SERPL-MCNC: 40 MG/DL
LDL CHOLESTEROL CALCULATED: 73 MG/DL (ref 0–99)
TRIGL SERPL-MCNC: 147 MG/DL (ref 0–149)
VITAMIN D 25-HYDROXY: 34 NG/ML (ref 30–100)
VLDLC SERPL CALC-MCNC: 29 MG/DL

## 2022-08-18 PROCEDURE — 83036 HEMOGLOBIN GLYCOSYLATED A1C: CPT | Performed by: NURSE PRACTITIONER

## 2022-08-18 PROCEDURE — 99214 OFFICE O/P EST MOD 30 MIN: CPT | Performed by: NURSE PRACTITIONER

## 2022-08-18 PROCEDURE — 3044F HG A1C LEVEL LT 7.0%: CPT | Performed by: NURSE PRACTITIONER

## 2022-08-18 RX ORDER — MULTIVIT-MIN/IRON/FOLIC ACID/K 18-600-40
1 CAPSULE ORAL DAILY
COMMUNITY

## 2022-08-18 NOTE — PROGRESS NOTES
Tobacco:   reports that he has never smoked. He has never used smokeless tobacco.  Alcohol:   reports current alcohol use. Drugs:   reports no history of drug use. FAMILY HISTORY   Family History   Problem Relation Age of Onset    Other Father         leukemia,     Kidney Disease Father     Lung Cancer Brother     Other Sister         chrohns disease    Thyroid Disease Sister        ALLERGIES AND DRUG REACTIONS   No Known Allergies    CURRENT MEDICATIONS   Current Outpatient Medications   Medication Sig Dispense Refill    Cholecalciferol (VITAMIN D) 50 MCG (2000 UT) CAPS capsule Take 1 capsule by mouth daily      Semaglutide, 1 MG/DOSE, (OZEMPIC, 1 MG/DOSE,) 4 MG/3ML SOPN Administer 1 mg subq weekly 3 mL 3    lisinopril (PRINIVIL;ZESTRIL) 10 MG tablet TAKE 1 TABLET BY MOUTH EVERY DAY      metoprolol succinate (TOPROL XL) 25 MG extended release tablet TAKE 1 TABLET BY MOUTH EVERY DAY      Semaglutide,0.25 or 0.5MG/DOS, (OZEMPIC, 0.25 OR 0.5 MG/DOSE,) 2 MG/1.5ML SOPN 0.5 mg once weekly subcutaneous (Patient not taking: Reported on 8/18/2022) 3 pen 3    blood glucose test strips (ACCU-CHEK GUIDE) strip 1 each by In Vitro route daily As needed. 100 each 3    Accu-Chek FastClix Lancets MISC To check blood sugar daily 100 each 3    atorvastatin (LIPITOR) 40 MG tablet Take 40 mg by mouth daily      aspirin 81 MG tablet Take 81 mg by mouth daily       No current facility-administered medications for this visit. Review of Systems  Constitutional: No fever, no chills, no diaphoresis, no generalized weakness. HEENT: No blurred vision, No sore throat, no ear pain, no hair loss  Neck: denied any neck swelling, difficulty swallowing,   Cardio-pulmonary: No CP, SOB or palpitation, No orthopnea or PND. No cough or wheezing. GI: No N/V/D, no constipation, No abdominal pain, no melena or hematochezia   : Denied any dysuria, hematuria, flank pain, discharge, or incontinence.    Skin: denied any rash, ulcer, Hirsute, or hyperpigmentation. MSK: denied any joint deformity, joint pain/swelling, muscle pain, or back pain. Neuro: no numbness, no tingling, no weakness    OBJECTIVE    /75   Pulse 87   Ht 5' 8\" (1.727 m)   Wt 243 lb (110.2 kg)   BMI 36.95 kg/m²   BP Readings from Last 4 Encounters:   08/18/22 132/75   05/11/22 109/77   03/28/22 125/81   07/19/20 (!) 153/87     Wt Readings from Last 6 Encounters:   08/18/22 243 lb (110.2 kg)   05/11/22 252 lb (114.3 kg)   03/28/22 262 lb (118.8 kg)   07/19/20 265 lb (120.2 kg)   09/17/19 257 lb 3.2 oz (116.7 kg)   01/08/16 257 lb (116.6 kg)       Physical examination:  General: awake alert, oriented x3, no abnormal position or movements. HEENT: normocephalic non-traumatic, no exophthalmos   Neck: supple, no LN enlargement, no thyromegaly, no thyroid tenderness, no JVD. Pulm: Clear equal air entry no added sounds, no wheezing or rhonchi    CVS: S1 + S2, no murmur, no heave. Dorsalis pedis pulse palpable   Abd: soft lax, no tenderness, no organomegaly, audible bowel sounds. Skin: warm, no lesions, no rash.  No callus, no Ulcers, No acanthosis nigricans  Musculoskeletal: No back tenderness, no kyphosis/scoliosis    Neuro: CN intact,  sensation normal bilateral , muscle power normal  Psych: normal mood, and affect      Review of Laboratory Data:  I personally reviewed the following lab:  Lab Results   Component Value Date/Time    WBC 5.6 11/20/2015 12:00 PM    RBC 4.73 11/20/2015 12:00 PM    HGB 15.0 11/20/2015 12:00 PM    HCT 43.8 11/20/2015 12:00 PM    MCV 92.5 11/20/2015 12:00 PM    MCH 31.7 11/20/2015 12:00 PM    MCHC 34.3 11/20/2015 12:00 PM    RDW 12.7 11/20/2015 12:00 PM     11/20/2015 12:00 PM    MPV 8.9 11/20/2015 12:00 PM      Lab Results   Component Value Date/Time     03/28/2022 10:03 AM    K 4.7 03/28/2022 10:03 AM    CO2 23 03/28/2022 10:03 AM    BUN 12 03/28/2022 10:03 AM    CREATININE 0.8 03/28/2022 10:03 AM    CALCIUM 9.7 03/28/2022 10:03 AM LABGLOM >60 03/28/2022 10:03 AM    GFRAA >60 03/28/2022 10:03 AM      Lab Results   Component Value Date/Time    TSH 2.360 03/28/2022 10:03 AM     Lab Results   Component Value Date/Time    LABA1C 6.8 08/18/2022 09:35 AM    GLUCOSE 255 03/28/2022 10:03 AM    GLUCOSE 89 02/09/2012 07:15 PM    MALBCR 12.1 03/28/2022 10:09 AM    LABMICR 13.8 03/28/2022 10:09 AM    LABCREA 114 03/28/2022 10:09 AM     Lab Results   Component Value Date/Time    LABA1C 6.8 08/18/2022 09:35 AM    LABA1C 9.2 03/28/2022 09:37 AM    LABA1C 6.3 09/17/2019 09:00 AM     Lab Results   Component Value Date/Time    TRIG 197 03/28/2022 10:03 AM    HDL 46 03/28/2022 10:03 AM    LDLCALC 92 03/28/2022 10:03 AM    CHOL 177 03/28/2022 10:03 AM     Lab Results   Component Value Date/Time    VITD25 19 03/28/2022 10:03 AM       ASSESSMENT & RECOMMENDATIONS   Zachery Boogie, a 45 y.o.-old male seen in for the following issues       Assessment:      Diagnosis Orders   1. Type 2 diabetes mellitus without complication, without long-term current use of insulin (HCC)        2. Class 2 severe obesity due to excess calories with serious comorbidity and body mass index (BMI) of 36.0 to 36.9 in adult ([facility]        3. Mixed hyperlipidemia  LIPID PANEL      4. Vitamin D deficiency  Vitamin D 25 Hydroxy          Plan:     1. Uncontrolled type 2 diabetes mellitus with hyperglycemia ([facility]   Patient's diabetes is well controlled per hemoglobin A1c. Hemoglobin A1c 9.2% -6.8%  Patient cannot tolerate metformin in the past  Continue  Ozempic 1 mg   Discussed with patient A1c and blood sugar goals   Optimal blood sugars: 100-140 pre-prandial, < 180 peak post-prandial  The patient counseled about the complications of uncontrolled diabetes   Discussed lifestyle changes including diet and exercise with patient  A1c due at next OV     2.  Class 2 severe obesity due to excess calories with serious comorbidity and body mass index (BMI) 36   Discussed lifestyle changes including diet and exercise with patient in depth. Also discussed with patient cardiovascular risk associated with obesity  On GLP-1  Has lost 20lb since last OV     3. Mixed hyperlipidemia   Continue atorvastatin. Counseled on the importance of statin therapy with diabetes  Recheck lipids     4. Vitamin D deficiency   Patient risk for vitamin D deficiency. On Vit D replacement  Counseled on the importance of vitamin D and bone health       I personally spent greater than 30 minutes reviewing  external notes from PCP and other patient's care team providers, and personally interpreted labs associated with the above diagnosis. I also ordered labs to further assess and manage the above addressed medical conditions. Return in about 3 months (around 11/18/2022) for type 2 DM. The above issues were reviewed with the patient who understood and agreed with the plan. Thank you for allowing us to participate in the care of this patient. Please do not hesitate to contact us with any additional questions. KYARA Lambert NP [facility] - BEHAVIORAL HEALTH SERVICES Diabetes Care and Endocrinology   [address] [facility] [address]   Phone: [phone]  Fax: [phone]  --------------------------------------------  An electronic signature was used to authenticate this note.  KYARA Lambert NP on 8/18/2022 at 9:49 AM

## 2022-08-23 ENCOUNTER — TELEPHONE (OUTPATIENT)
Dept: ENDOCRINOLOGY | Age: 52
End: 2022-08-23

## 2022-08-23 NOTE — TELEPHONE ENCOUNTER
----- Message from [address], APRN - NP sent at 8/22/2022  9:25 AM EDT -----  Please inform pt that I have reviewed his labs and his Vit D is at goal and his Lipid panel

## 2022-08-26 ENCOUNTER — TELEPHONE (OUTPATIENT)
Dept: ENDOCRINOLOGY | Age: 52
End: 2022-08-26

## 2022-08-26 DIAGNOSIS — E11.9 TYPE 2 DIABETES MELLITUS WITHOUT COMPLICATION, WITHOUT LONG-TERM CURRENT USE OF INSULIN (HCC): Primary | ICD-10-CM

## 2022-08-29 NOTE — TELEPHONE ENCOUNTER
Called and spoke to patient. He will use his current pens until he runs out as directed.  (2 injections of 1mg for a total of 2mg)

## 2022-08-29 NOTE — TELEPHONE ENCOUNTER
Angeline Arenas, APRN - NP  [facility] Clinical Staff 2 hours ago (11:33 AM)       Ok to take 2mg, he can use 2 injections of 1mg to use what he has left then we can reorder his 2mg pen

## 2022-09-09 RX ORDER — SEMAGLUTIDE 2.68 MG/ML
2 INJECTION, SOLUTION SUBCUTANEOUS WEEKLY
Qty: 12 ML | Refills: 3 | OUTPATIENT
Start: 2022-09-09

## 2022-11-21 ENCOUNTER — OFFICE VISIT (OUTPATIENT)
Dept: ENDOCRINOLOGY | Age: 52
End: 2022-11-21
Payer: COMMERCIAL

## 2022-11-21 VITALS
HEIGHT: 68 IN | WEIGHT: 244 LBS | BODY MASS INDEX: 36.98 KG/M2 | OXYGEN SATURATION: 98 % | SYSTOLIC BLOOD PRESSURE: 128 MMHG | HEART RATE: 59 BPM | DIASTOLIC BLOOD PRESSURE: 84 MMHG

## 2022-11-21 DIAGNOSIS — E66.01 CLASS 2 SEVERE OBESITY DUE TO EXCESS CALORIES WITH SERIOUS COMORBIDITY AND BODY MASS INDEX (BMI) OF 37.0 TO 37.9 IN ADULT (HCC): ICD-10-CM

## 2022-11-21 DIAGNOSIS — E55.9 VITAMIN D DEFICIENCY: ICD-10-CM

## 2022-11-21 DIAGNOSIS — E78.2 MIXED HYPERLIPIDEMIA: ICD-10-CM

## 2022-11-21 DIAGNOSIS — E11.9 TYPE 2 DIABETES MELLITUS WITHOUT COMPLICATION, WITHOUT LONG-TERM CURRENT USE OF INSULIN (HCC): Primary | ICD-10-CM

## 2022-11-21 LAB — HBA1C MFR BLD: 6.9 %

## 2022-11-21 PROCEDURE — 3044F HG A1C LEVEL LT 7.0%: CPT | Performed by: CLINICAL NURSE SPECIALIST

## 2022-11-21 PROCEDURE — 99214 OFFICE O/P EST MOD 30 MIN: CPT | Performed by: CLINICAL NURSE SPECIALIST

## 2022-11-21 PROCEDURE — 83036 HEMOGLOBIN GLYCOSYLATED A1C: CPT | Performed by: CLINICAL NURSE SPECIALIST

## 2022-11-21 NOTE — PROGRESS NOTES
[address] [facility] Department of Endocrinology Diabetes and Metabolism   [address] [facility] [address]   Phone: [phone]  Fax: [phone]    Date of Service: 11/21/2022    Primary Care Physician: Akila Alcala DO  Referring physician: No ref. provider found  Provider: KYARA Montgomery     Reason for the visit:    Type 2 DM     History of Present Illness: The history is provided by the patient. No  was used. Accuracy of the patient data is excellent. Kev Tuttle is a very pleasant 45 y.o. male seen today for diabetes management     Dx with DM in 2017 in . Currently on Ozempic on 0.5 mg weekly. Complaining of intermittent nausea and would like a change in medication    Hx of previous metformin use but caused pt to feel lethargic     Fingerstick checks are daily  Per BG log FBS usually mid 100's         Lab Results   Component Value Date/Time    LABA1C 6.9 11/21/2022 10:48 AM    LABA1C 6.8 08/18/2022 09:35 AM    LABA1C 9.2 03/28/2022 09:37 AM     He has had no hypoglycemic events  The patient has been mindful of what has been eating and following diabetic diet as encouraged  I reviewed current medications and the patient is currently not on any diabetic medication. The patient is due for an eye exam. Last eye exam was a few years ago no h/o diabetic retinopathy.   The patient has not seen any podiatrist in the past.   Microvascular complications:  No Retinopathy, Nephropathy or Neuropathy   Macrovascular complications: history of CAD s/p stent placement in August 2018, no PVD or Stroke  The patient has not received Flushot or Pneumonia vaccination    PAST MEDICAL HISTORY   Past Medical History:   Diagnosis Date    CAD S/P percutaneous coronary angioplasty     Hyperlipidemia     Hypertension     Type 2 diabetes mellitus ([facility] [address]        PAST SURGICAL HISTORY   Past Surgical History:   Procedure Laterality Date    APPENDECTOMY  1993       SOCIAL HISTORY   Tobacco:   reports that he has never smoked. He has never used smokeless tobacco.  Alcohol:   reports current alcohol use. Drugs:   reports no history of drug use. FAMILY HISTORY   Family History   Problem Relation Age of Onset    Other Father         leukemia,     Kidney Disease Father     Lung Cancer Brother     Other Sister         chrohns disease    Thyroid Disease Sister        ALLERGIES AND DRUG REACTIONS   No Known Allergies    CURRENT MEDICATIONS   Current Outpatient Medications   Medication Sig Dispense Refill    empagliflozin (JARDIANCE) 10 MG tablet Take 1 tablet by mouth daily 90 tablet 1    atorvastatin (LIPITOR) 40 MG tablet Take 40 mg by mouth daily      Cholecalciferol (VITAMIN D) 50 MCG (2000 UT) CAPS capsule Take 1 capsule by mouth daily      Semaglutide, 1 MG/DOSE, (OZEMPIC, 1 MG/DOSE,) 4 MG/3ML SOPN Administer 1 mg subq weekly 3 mL 3    lisinopril (PRINIVIL;ZESTRIL) 10 MG tablet TAKE 1 TABLET BY MOUTH EVERY DAY      metoprolol succinate (TOPROL XL) 25 MG extended release tablet TAKE 1 TABLET BY MOUTH EVERY DAY      blood glucose test strips (ACCU-CHEK GUIDE) strip 1 each by In Vitro route daily As needed. 100 each 3    Accu-Chek FastClix Lancets MISC To check blood sugar daily 100 each 3    aspirin 81 MG tablet Take 81 mg by mouth daily       No current facility-administered medications for this visit. Review of Systems  Constitutional: No fever, no chills, no diaphoresis, no generalized weakness. HEENT: No blurred vision, No sore throat, no ear pain, no hair loss  Neck: denied any neck swelling, difficulty swallowing,   Cardio-pulmonary: No CP, SOB or palpitation, No orthopnea or PND. No cough or wheezing. GI: No N/V/D, no constipation, No abdominal pain, no melena or hematochezia   : Denied any dysuria, hematuria, flank pain, discharge, or incontinence. Skin: denied any rash, ulcer, Hirsute, or hyperpigmentation.    MSK: denied any joint deformity, joint pain/swelling, muscle pain, or back pain. Neuro: no numbness, no tingling, no weakness    OBJECTIVE    /84   Pulse 59   Ht 5' 8\" (1.727 m)   Wt 244 lb (110.7 kg)   SpO2 98%   BMI 37.10 kg/m²   BP Readings from Last 4 Encounters:   11/21/22 128/84   08/18/22 132/75   05/11/22 109/77   03/28/22 125/81     Wt Readings from Last 6 Encounters:   11/21/22 244 lb (110.7 kg)   08/18/22 243 lb (110.2 kg)   05/11/22 252 lb (114.3 kg)   03/28/22 262 lb (118.8 kg)   07/19/20 265 lb (120.2 kg)   09/17/19 257 lb 3.2 oz (116.7 kg)       Physical examination:  General: awake alert, oriented x3, no abnormal position or movements. HEENT: normocephalic non-traumatic, no exophthalmos   Neck: supple, no LN enlargement, no thyromegaly, no thyroid tenderness, no JVD. Pulm: Clear equal air entry no added sounds, no wheezing or rhonchi    CVS: S1 + S2, no murmur, no heave. Dorsalis pedis pulse palpable   Abd: soft lax, no tenderness, no organomegaly, audible bowel sounds. Skin: warm, no lesions, no rash.  No callus, no Ulcers, No acanthosis nigricans  Musculoskeletal: No back tenderness, no kyphosis/scoliosis    Neuro: CN intact,  sensation normal bilateral , muscle power normal  Psych: normal mood, and affect      Review of Laboratory Data:  I personally reviewed the following lab:  Lab Results   Component Value Date/Time    WBC 5.6 11/20/2015 12:00 PM    RBC 4.73 11/20/2015 12:00 PM    HGB 15.0 11/20/2015 12:00 PM    HCT 43.8 11/20/2015 12:00 PM    MCV 92.5 11/20/2015 12:00 PM    MCH 31.7 11/20/2015 12:00 PM    MCHC 34.3 11/20/2015 12:00 PM    RDW 12.7 11/20/2015 12:00 PM     11/20/2015 12:00 PM    MPV 8.9 11/20/2015 12:00 PM      Lab Results   Component Value Date/Time     03/28/2022 10:03 AM    K 4.7 03/28/2022 10:03 AM    CO2 23 03/28/2022 10:03 AM    BUN 12 03/28/2022 10:03 AM    CREATININE 0.8 03/28/2022 10:03 AM    CALCIUM 9.7 03/28/2022 10:03 AM    LABGLOM >60 03/28/2022 10:03 AM    GFRAA >60 03/28/2022 10:03 AM Lab Results   Component Value Date/Time    TSH 2.360 03/28/2022 10:03 AM     Lab Results   Component Value Date/Time    LABA1C 6.9 11/21/2022 10:48 AM    GLUCOSE 255 03/28/2022 10:03 AM    GLUCOSE 89 02/09/2012 07:15 PM    MALBCR 12.1 03/28/2022 10:09 AM    LABMICR 13.8 03/28/2022 10:09 AM    LABCREA 114 03/28/2022 10:09 AM     Lab Results   Component Value Date/Time    LABA1C 6.9 11/21/2022 10:48 AM    LABA1C 6.8 08/18/2022 09:35 AM    LABA1C 9.2 03/28/2022 09:37 AM     Lab Results   Component Value Date/Time    TRIG 147 08/18/2022 09:55 AM    HDL 40 08/18/2022 09:55 AM    LDLCALC 73 08/18/2022 09:55 AM    CHOL 142 08/18/2022 09:55 AM     Lab Results   Component Value Date/Time    VITD25 34 08/18/2022 09:55 AM    VITD25 19 03/28/2022 10:03 AM       ASSESSMENT & RECOMMENDATIONS   Quyen Cox, a 45 y.o.-old male seen in for the following issues       Assessment:      Diagnosis Orders   1. Type 2 diabetes mellitus without complication, without long-term current use of insulin (HCC)  POCT glycosylated hemoglobin (Hb A1C)      2. Class 2 severe obesity due to excess calories with serious comorbidity and body mass index (BMI) of 37.0 to 37.9 in adult ([facility] [address]        3. Mixed hyperlipidemia        4. Vitamin D deficiency            Plan:     1. Uncontrolled type 2 diabetes mellitus with hyperglycemia ([facility] [address]   Patient's diabetes is well controlled per hemoglobin A1c. Hemoglobin A1c 6.9%  Patient cannot tolerate metformin in the past  Patient complaining of intermittent nausea with Ozempic 0.5 mg weekly  Will stop Ozempic  Will start Jardiance 10 mg 1 tablet daily  No contraindications.   Counseled on side effects  Check blood sugars twice per day  Send blood glucose log in 2 weeks  Discussed with patient A1c and blood sugar goals   Optimal blood sugars: 100-140 pre-prandial, < 180 peak post-prandial  The patient counseled about the complications of uncontrolled diabetes   Discussed lifestyle changes including diet and exercise with patient       2. Class 2 severe obesity due to excess calories with serious comorbidity    Discussed lifestyle changes including diet and exercise with patient in depth. Also discussed with patient cardiovascular risk associated with obesity  Will start Jardiance 10 mg daily     3. Mixed hyperlipidemia   Continue atorvastatin. Counseled on the importance of statin therapy with diabetes  Last lipids stable     4. Vitamin D deficiency    On Vit D replacement  Counseled on the importance of vitamin D and bone health       I personally spent greater than 30 minutes reviewing  external notes from PCP and other patient's care team providers, and personally interpreted labs associated with the above diagnosis. I also ordered labs to further assess and manage the above addressed medical conditions. Return in about 3 months (around 2/21/2023). The above issues were reviewed with the patient who understood and agreed with the plan. Thank you for allowing us to participate in the care of this patient. Please do not hesitate to contact us with any additional questions. KYARA Medina    [facility] Diabetes Care and Endocrinology   [address] [facility] [address]   Phone: [phone]  Fax: [phone]  --------------------------------------------  An electronic signature was used to authenticate this note.  KYARA Medina on 11/21/2022 at 11:03 AM

## 2023-02-27 DIAGNOSIS — E11.65 UNCONTROLLED TYPE 2 DIABETES MELLITUS WITH HYPERGLYCEMIA (HCC): ICD-10-CM

## 2023-02-27 RX ORDER — BLOOD SUGAR DIAGNOSTIC
STRIP MISCELLANEOUS
Qty: 100 STRIP | Refills: 4 | Status: SHIPPED | OUTPATIENT
Start: 2023-02-27

## 2023-02-27 RX ORDER — LANCETS
EACH MISCELLANEOUS
Qty: 102 EACH | Refills: 5 | Status: SHIPPED | OUTPATIENT
Start: 2023-02-27

## 2023-05-01 ENCOUNTER — OFFICE VISIT (OUTPATIENT)
Dept: ENDOCRINOLOGY | Age: 53
End: 2023-05-01
Payer: COMMERCIAL

## 2023-05-01 VITALS
DIASTOLIC BLOOD PRESSURE: 86 MMHG | OXYGEN SATURATION: 99 % | WEIGHT: 247 LBS | BODY MASS INDEX: 37.44 KG/M2 | HEIGHT: 68 IN | SYSTOLIC BLOOD PRESSURE: 137 MMHG | HEART RATE: 63 BPM

## 2023-05-01 DIAGNOSIS — E55.9 VITAMIN D DEFICIENCY: ICD-10-CM

## 2023-05-01 DIAGNOSIS — E66.01 CLASS 2 SEVERE OBESITY DUE TO EXCESS CALORIES WITH SERIOUS COMORBIDITY AND BODY MASS INDEX (BMI) OF 37.0 TO 37.9 IN ADULT (HCC): ICD-10-CM

## 2023-05-01 DIAGNOSIS — E78.2 MIXED HYPERLIPIDEMIA: ICD-10-CM

## 2023-05-01 DIAGNOSIS — E11.65 UNCONTROLLED TYPE 2 DIABETES MELLITUS WITH HYPERGLYCEMIA (HCC): Primary | ICD-10-CM

## 2023-05-01 LAB — HBA1C MFR BLD: 7.4 %

## 2023-05-01 PROCEDURE — 3051F HG A1C>EQUAL 7.0%<8.0%: CPT | Performed by: CLINICAL NURSE SPECIALIST

## 2023-05-01 PROCEDURE — 83036 HEMOGLOBIN GLYCOSYLATED A1C: CPT | Performed by: CLINICAL NURSE SPECIALIST

## 2023-05-01 PROCEDURE — 99214 OFFICE O/P EST MOD 30 MIN: CPT | Performed by: CLINICAL NURSE SPECIALIST

## 2023-05-01 RX ORDER — METFORMIN HYDROCHLORIDE 500 MG/1
500 TABLET, EXTENDED RELEASE ORAL
Qty: 90 TABLET | Refills: 2 | Status: SHIPPED | OUTPATIENT
Start: 2023-05-01

## 2023-05-01 NOTE — PROGRESS NOTES
[address] [facility] Department of Endocrinology Diabetes and Metabolism   [address] [facility] [address]   Phone: [phone]  Fax: [phone]    Date of Service: 5/1/2023    Primary Care Physician: Kolton Henderson DO  Referring physician: No ref. provider found  Provider: KYARA Alexander     Reason for the visit:    Type 2 DM     History of Present Illness: The history is provided by the patient. No  was used. Accuracy of the patient data is excellent. Cierra Tao is a very pleasant 47 y.o. male seen today for diabetes management     Dx with DM in 2017 in . Currently on Jardiance 10 mg daily   Stopped ozempic as it caused severe nausea     Hx of previous metformin use but caused pt to feel lethargic     Fingerstick checks are daily  Per BG log FBS usually mid 100's         Lab Results   Component Value Date/Time    LABA1C 7.4 05/01/2023 10:34 AM    LABA1C 6.9 11/21/2022 10:48 AM    LABA1C 6.8 08/18/2022 09:35 AM     He has had no hypoglycemic events  The patient has been mindful of what has been eating and following diabetic diet as encouraged  I reviewed current medications and the patient is currently not on any diabetic medication. The patient is due for an eye exam. Last eye exam was a few years ago no h/o diabetic retinopathy.   The patient has not seen any podiatrist in the past.   Microvascular complications:  No Retinopathy, Nephropathy or Neuropathy   Macrovascular complications: history of CAD s/p stent placement in August 2018, no PVD or Stroke  The patient has not received Flushot or Pneumonia vaccination    PAST MEDICAL HISTORY   Past Medical History:   Diagnosis Date    CAD S/P percutaneous coronary angioplasty     Hyperlipidemia     Hypertension     Type 2 diabetes mellitus ([facility] [address]        PAST SURGICAL HISTORY   Past Surgical History:   Procedure Laterality Date    APPENDECTOMY  1993       SOCIAL HISTORY   Tobacco:   reports that

## 2023-06-25 RX ORDER — EMPAGLIFLOZIN 10 MG/1
TABLET, FILM COATED ORAL
Qty: 90 TABLET | Refills: 1 | Status: SHIPPED | OUTPATIENT
Start: 2023-06-25

## 2023-09-07 ENCOUNTER — OFFICE VISIT (OUTPATIENT)
Dept: ENDOCRINOLOGY | Age: 53
End: 2023-09-07
Payer: COMMERCIAL

## 2023-09-07 VITALS
SYSTOLIC BLOOD PRESSURE: 128 MMHG | WEIGHT: 246 LBS | HEIGHT: 68 IN | BODY MASS INDEX: 37.28 KG/M2 | OXYGEN SATURATION: 99 % | DIASTOLIC BLOOD PRESSURE: 80 MMHG | HEART RATE: 54 BPM | RESPIRATION RATE: 18 BRPM

## 2023-09-07 DIAGNOSIS — E66.01 CLASS 2 SEVERE OBESITY DUE TO EXCESS CALORIES WITH SERIOUS COMORBIDITY AND BODY MASS INDEX (BMI) OF 37.0 TO 37.9 IN ADULT (HCC): ICD-10-CM

## 2023-09-07 DIAGNOSIS — E11.65 UNCONTROLLED TYPE 2 DIABETES MELLITUS WITH HYPERGLYCEMIA (HCC): Primary | ICD-10-CM

## 2023-09-07 DIAGNOSIS — E78.2 MIXED HYPERLIPIDEMIA: ICD-10-CM

## 2023-09-07 DIAGNOSIS — E55.9 VITAMIN D DEFICIENCY: ICD-10-CM

## 2023-09-07 DIAGNOSIS — E11.65 UNCONTROLLED TYPE 2 DIABETES MELLITUS WITH HYPERGLYCEMIA (HCC): ICD-10-CM

## 2023-09-07 LAB
CREATININE URINE: 76.8 MG/DL (ref 40–278)
HBA1C MFR BLD: 7 %
MICROALBUMIN/CREAT 24H UR: <12 MG/L (ref 0–19)
MICROALBUMIN/CREAT UR-RTO: NORMAL MCG/MG CREAT (ref 0–30)

## 2023-09-07 PROCEDURE — 3051F HG A1C>EQUAL 7.0%<8.0%: CPT | Performed by: CLINICAL NURSE SPECIALIST

## 2023-09-07 PROCEDURE — 83036 HEMOGLOBIN GLYCOSYLATED A1C: CPT | Performed by: CLINICAL NURSE SPECIALIST

## 2023-09-07 PROCEDURE — 99214 OFFICE O/P EST MOD 30 MIN: CPT | Performed by: CLINICAL NURSE SPECIALIST

## 2023-09-07 NOTE — PROGRESS NOTES
[address] [facility] Department of Endocrinology Diabetes and Metabolism   [address] [facility] [address]   Phone: [phone]  Fax: [phone]    Date of Service: 9/7/2023    Primary Care Physician: Vel Gamez DO  Referring physician: No ref. provider found  Provider: KYARA Cook - CNS     Reason for the visit:    Type 2 DM     History of Present Illness: The history is provided by the patient. No  was used. Accuracy of the patient data is excellent. Divya London is a very pleasant 47 y.o. male seen today for diabetes management     Dx with DM in 2017 in . Currently on Jardiance 10 mg daily , Metformin  mg daily (Can not tolerate higher doses)  Stopped ozempic as it caused severe nausea       Fingerstick checks are daily  Per BG log FBS usually mid 100's         Lab Results   Component Value Date/Time    LABA1C 7.0 09/07/2023 10:04 AM    LABA1C 7.4 05/01/2023 10:34 AM    LABA1C 6.9 11/21/2022 10:48 AM     He has had no hypoglycemic events  The patient has been mindful of what has been eating and following diabetic diet as encouraged  I reviewed current medications and the patient is currently not on any diabetic medication. The patient is due for an eye exam. Last eye exam was a few years ago no h/o diabetic retinopathy.   The patient has not seen any podiatrist in the past.   Microvascular complications:  No Retinopathy, Nephropathy or Neuropathy   Macrovascular complications: history of CAD s/p stent placement in August 2018, no PVD or Stroke  The patient has not received Flushot or Pneumonia vaccination    PAST MEDICAL HISTORY   Past Medical History:   Diagnosis Date    CAD S/P percutaneous coronary angioplasty     Hyperlipidemia     Hypertension     Type 2 diabetes mellitus ([address])        PAST SURGICAL HISTORY   Past Surgical History:   Procedure Laterality Date    APPENDECTOMY  1993       SOCIAL HISTORY   Tobacco:   reports that he

## 2024-02-01 RX ORDER — METFORMIN HYDROCHLORIDE 500 MG/1
500 TABLET, EXTENDED RELEASE ORAL DAILY
Qty: 90 TABLET | Refills: 2 | Status: SHIPPED | OUTPATIENT
Start: 2024-02-01

## 2024-02-15 ENCOUNTER — OFFICE VISIT (OUTPATIENT)
Dept: ENDOCRINOLOGY | Age: 54
End: 2024-02-15
Payer: COMMERCIAL

## 2024-02-15 VITALS
BODY MASS INDEX: 38.19 KG/M2 | WEIGHT: 252 LBS | DIASTOLIC BLOOD PRESSURE: 77 MMHG | RESPIRATION RATE: 18 BRPM | OXYGEN SATURATION: 98 % | HEIGHT: 68 IN | HEART RATE: 59 BPM | SYSTOLIC BLOOD PRESSURE: 119 MMHG

## 2024-02-15 DIAGNOSIS — E11.65 UNCONTROLLED TYPE 2 DIABETES MELLITUS WITH HYPERGLYCEMIA (HCC): Primary | ICD-10-CM

## 2024-02-15 DIAGNOSIS — E55.9 VITAMIN D DEFICIENCY: ICD-10-CM

## 2024-02-15 DIAGNOSIS — E11.9 TYPE 2 DIABETES MELLITUS WITHOUT COMPLICATION, WITHOUT LONG-TERM CURRENT USE OF INSULIN (HCC): ICD-10-CM

## 2024-02-15 DIAGNOSIS — E78.2 MIXED HYPERLIPIDEMIA: ICD-10-CM

## 2024-02-15 LAB — HBA1C MFR BLD: 7.1 %

## 2024-02-15 PROCEDURE — 3051F HG A1C>EQUAL 7.0%<8.0%: CPT | Performed by: NURSE PRACTITIONER

## 2024-02-15 PROCEDURE — 99214 OFFICE O/P EST MOD 30 MIN: CPT | Performed by: NURSE PRACTITIONER

## 2024-02-15 PROCEDURE — 83036 HEMOGLOBIN GLYCOSYLATED A1C: CPT | Performed by: NURSE PRACTITIONER

## 2024-02-15 NOTE — PROGRESS NOTES
SOCIAL HISTORY   Tobacco:   reports that he has never smoked. He has never used smokeless tobacco.  Alcohol:   reports current alcohol use.  Drugs:   reports no history of drug use.    FAMILY HISTORY   Family History   Problem Relation Age of Onset    Other Father         leukemia,     Kidney Disease Father     Lung Cancer Brother     Other Sister         chrohns disease    Thyroid Disease Sister        ALLERGIES AND DRUG REACTIONS   No Known Allergies    CURRENT MEDICATIONS   Current Outpatient Medications   Medication Sig Dispense Refill    empagliflozin (JARDIANCE) 25 MG tablet Take 1 tablet by mouth daily 30 tablet 3    metFORMIN (GLUCOPHAGE-XR) 500 MG extended release tablet TAKE 1 TABLET BY MOUTH DAILY WITH BREAKFAST 90 tablet 2    empagliflozin (JARDIANCE) 10 MG tablet Take 1 tablet by mouth daily 90 tablet 2    Accu-Chek FastClix Lancets MISC CHECK BLOOD SUGAR DAILY AS DIRECTED 102 each 5    ACCU-CHEK GUIDE strip USE TO TEST EVERY  strip 4    Cholecalciferol (VITAMIN D) 50 MCG (2000 UT) CAPS capsule Take 1 capsule by mouth daily      lisinopril (PRINIVIL;ZESTRIL) 10 MG tablet TAKE 1 TABLET BY MOUTH EVERY DAY      metoprolol succinate (TOPROL XL) 25 MG extended release tablet TAKE 1 TABLET BY MOUTH EVERY DAY      atorvastatin (LIPITOR) 40 MG tablet Take 1 tablet by mouth daily      aspirin 81 MG tablet Take 1 tablet by mouth daily       No current facility-administered medications for this visit.       Review of Systems  Constitutional: No fever, no chills, no diaphoresis, no generalized weakness.  HEENT: No blurred vision, No sore throat, no ear pain, no hair loss  Neck: denied any neck swelling, difficulty swallowing,   Cardio-pulmonary: No CP, SOB or palpitation, No orthopnea or PND. No cough or wheezing.  GI: No N/V/D, no constipation, No abdominal pain, no melena or hematochezia   : Denied any dysuria, hematuria, flank pain, discharge, or incontinence.   Skin: denied any rash, ulcer,

## 2024-06-18 ENCOUNTER — OFFICE VISIT (OUTPATIENT)
Dept: ENDOCRINOLOGY | Age: 54
End: 2024-06-18
Payer: COMMERCIAL

## 2024-06-18 VITALS — BODY MASS INDEX: 37.89 KG/M2 | WEIGHT: 250 LBS | HEIGHT: 68 IN

## 2024-06-18 DIAGNOSIS — E55.9 VITAMIN D DEFICIENCY: ICD-10-CM

## 2024-06-18 DIAGNOSIS — E11.9 TYPE 2 DIABETES MELLITUS WITHOUT COMPLICATION, WITHOUT LONG-TERM CURRENT USE OF INSULIN (HCC): Primary | ICD-10-CM

## 2024-06-18 DIAGNOSIS — E66.01 CLASS 2 SEVERE OBESITY DUE TO EXCESS CALORIES WITH SERIOUS COMORBIDITY AND BODY MASS INDEX (BMI) OF 38.0 TO 38.9 IN ADULT (HCC): ICD-10-CM

## 2024-06-18 DIAGNOSIS — E78.2 MIXED HYPERLIPIDEMIA: ICD-10-CM

## 2024-06-18 LAB — HBA1C MFR BLD: 6.8 %

## 2024-06-18 PROCEDURE — 3044F HG A1C LEVEL LT 7.0%: CPT | Performed by: NURSE PRACTITIONER

## 2024-06-18 PROCEDURE — 83036 HEMOGLOBIN GLYCOSYLATED A1C: CPT | Performed by: NURSE PRACTITIONER

## 2024-06-18 PROCEDURE — 99214 OFFICE O/P EST MOD 30 MIN: CPT | Performed by: NURSE PRACTITIONER

## 2024-06-18 NOTE — PROGRESS NOTES
[facility] Department of Endocrinology Diabetes and Metabolism   [address] [facility] [address], [place] [address]  Phone: [phone]  Fax: [phone]    Date of Service: 6/18/2024    Primary Care Physician: Henry Pruitt DO  Referring physician: No ref. provider found  Provider: KYARA Lincoln NP     Reason for the visit:    Type 2 DM     History of Present Illness:  The history is provided by the patient. No  was used. Accuracy of the patient data is excellent.  Jose E Sherman is a very pleasant 53 y.o. male seen today for diabetes management     Dx with DM in 2017 in . Currently on Jardiance 25 mg daily , Metformin  mg daily     Stopped ozempic as it caused severe nausea     Fingerstick checks are not daily       Lab Results   Component Value Date/Time    LABA1C 6.8 06/18/2024 10:10 AM    LABA1C 7.1 02/15/2024 09:41 AM    LABA1C 7.0 09/07/2023 10:04 AM     He has had no hypoglycemic events  The patient has been mindful of what has been eating and following diabetic diet as encouraged  I reviewed current medications and the patient is currently not on any diabetic medication.  The patient is due for an eye exam. Last eye exam was a few years ago no h/o diabetic retinopathy.  The patient has not seen any podiatrist in the past.   Microvascular complications:  No Retinopathy, Nephropathy or Neuropathy   Macrovascular complications: history of CAD s/p stent placement in August 2018, no PVD or Stroke  The patient has not received Flushot or Pneumonia vaccination    PAST MEDICAL HISTORY   Past Medical History:   Diagnosis Date    CAD S/P percutaneous coronary angioplasty     Hyperlipidemia     Hypertension     Type 2 diabetes mellitus (HCC)        PAST SURGICAL HISTORY   Past Surgical History:   Procedure Laterality Date    APPENDECTOMY  1993       SOCIAL HISTORY   Tobacco:   reports that he has never smoked. He has never used smokeless

## 2024-07-25 DIAGNOSIS — E11.9 TYPE 2 DIABETES MELLITUS WITHOUT COMPLICATION, WITHOUT LONG-TERM CURRENT USE OF INSULIN (HCC): ICD-10-CM

## 2024-07-25 RX ORDER — EMPAGLIFLOZIN 25 MG/1
25 TABLET, FILM COATED ORAL DAILY
Qty: 30 TABLET | Refills: 3 | Status: SHIPPED | OUTPATIENT
Start: 2024-07-25

## 2024-10-15 ENCOUNTER — OFFICE VISIT (OUTPATIENT)
Dept: ENDOCRINOLOGY | Age: 54
End: 2024-10-15
Payer: COMMERCIAL

## 2024-10-15 VITALS — HEIGHT: 68 IN | WEIGHT: 251 LBS | BODY MASS INDEX: 38.04 KG/M2

## 2024-10-15 DIAGNOSIS — E66.812 CLASS 2 SEVERE OBESITY DUE TO EXCESS CALORIES WITH SERIOUS COMORBIDITY AND BODY MASS INDEX (BMI) OF 38.0 TO 38.9 IN ADULT: ICD-10-CM

## 2024-10-15 DIAGNOSIS — E78.2 MIXED HYPERLIPIDEMIA: ICD-10-CM

## 2024-10-15 DIAGNOSIS — E55.9 VITAMIN D DEFICIENCY: ICD-10-CM

## 2024-10-15 DIAGNOSIS — E66.01 CLASS 2 SEVERE OBESITY DUE TO EXCESS CALORIES WITH SERIOUS COMORBIDITY AND BODY MASS INDEX (BMI) OF 38.0 TO 38.9 IN ADULT: ICD-10-CM

## 2024-10-15 DIAGNOSIS — E11.9 TYPE 2 DIABETES MELLITUS WITHOUT COMPLICATION, WITHOUT LONG-TERM CURRENT USE OF INSULIN (HCC): Primary | ICD-10-CM

## 2024-10-15 LAB — HBA1C MFR BLD: 6.9 %

## 2024-10-15 PROCEDURE — 83036 HEMOGLOBIN GLYCOSYLATED A1C: CPT | Performed by: NURSE PRACTITIONER

## 2024-10-15 PROCEDURE — 3044F HG A1C LEVEL LT 7.0%: CPT | Performed by: NURSE PRACTITIONER

## 2024-10-15 PROCEDURE — 99214 OFFICE O/P EST MOD 30 MIN: CPT | Performed by: NURSE PRACTITIONER

## 2024-10-15 NOTE — PROGRESS NOTES
cardiovascular risk associated with obesity     3. Mixed hyperlipidemia   Continue atorvastatin.    Counseled on the importance of statin therapy with diabetes  Last lipids stable     4. Vitamin D deficiency    On Vit D replacement  Counseled on the importance of vitamin D and bone health       I personally spent  30 minutes reviewing  external notes from PCP and other patient's care team providers, and personally interpreted labs associated with the above diagnosis. I also ordered labs to further assess and manage the above addressed medical conditions.     Return in about 4 months (around 2/15/2025) for Type 2 DM .    The above issues were reviewed with the patient who understood and agreed with the plan.    Thank you for allowing us to participate in the care of this patient. Please do not hesitate to contact us with any additional questions.     KYARA Lincoln NP    [facility]   [address] [place] [address], [address], [place] [address]  Phone: [phone]  Fax: [phone]  --------------------------------------------  An electronic signature was used to authenticate this note. KYARA Lincoln NP on 10/15/2024 at 9:52 AM

## 2024-10-29 DIAGNOSIS — E11.9 TYPE 2 DIABETES MELLITUS WITHOUT COMPLICATION, WITHOUT LONG-TERM CURRENT USE OF INSULIN (HCC): Primary | ICD-10-CM

## 2024-12-29 DIAGNOSIS — E11.9 TYPE 2 DIABETES MELLITUS WITHOUT COMPLICATION, WITHOUT LONG-TERM CURRENT USE OF INSULIN (HCC): ICD-10-CM

## 2024-12-30 RX ORDER — EMPAGLIFLOZIN 25 MG/1
25 TABLET, FILM COATED ORAL DAILY
Qty: 90 TABLET | Refills: 1 | Status: SHIPPED | OUTPATIENT
Start: 2024-12-30

## 2025-01-06 DIAGNOSIS — E11.9 TYPE 2 DIABETES MELLITUS WITHOUT COMPLICATION, WITHOUT LONG-TERM CURRENT USE OF INSULIN (HCC): Primary | ICD-10-CM

## 2025-01-06 RX ORDER — METFORMIN HYDROCHLORIDE 500 MG/1
500 TABLET, EXTENDED RELEASE ORAL DAILY
Qty: 90 TABLET | Refills: 1 | Status: SHIPPED | OUTPATIENT
Start: 2025-01-06 | End: 2025-01-06 | Stop reason: ALTCHOICE

## 2025-02-20 ENCOUNTER — OFFICE VISIT (OUTPATIENT)
Dept: ENDOCRINOLOGY | Age: 55
End: 2025-02-20
Payer: COMMERCIAL

## 2025-02-20 VITALS
BODY MASS INDEX: 34.86 KG/M2 | DIASTOLIC BLOOD PRESSURE: 72 MMHG | TEMPERATURE: 98.1 F | WEIGHT: 230 LBS | HEIGHT: 68 IN | SYSTOLIC BLOOD PRESSURE: 110 MMHG

## 2025-02-20 DIAGNOSIS — E66.09 CLASS 1 OBESITY DUE TO EXCESS CALORIES WITH SERIOUS COMORBIDITY AND BODY MASS INDEX (BMI) OF 34.0 TO 34.9 IN ADULT: ICD-10-CM

## 2025-02-20 DIAGNOSIS — E78.2 MIXED HYPERLIPIDEMIA: ICD-10-CM

## 2025-02-20 DIAGNOSIS — E55.9 VITAMIN D DEFICIENCY: ICD-10-CM

## 2025-02-20 DIAGNOSIS — E11.9 TYPE 2 DIABETES MELLITUS WITHOUT COMPLICATION, WITHOUT LONG-TERM CURRENT USE OF INSULIN (HCC): Primary | ICD-10-CM

## 2025-02-20 DIAGNOSIS — E66.811 CLASS 1 OBESITY DUE TO EXCESS CALORIES WITH SERIOUS COMORBIDITY AND BODY MASS INDEX (BMI) OF 34.0 TO 34.9 IN ADULT: ICD-10-CM

## 2025-02-20 LAB — HBA1C MFR BLD: 6.2 %

## 2025-02-20 PROCEDURE — 99214 OFFICE O/P EST MOD 30 MIN: CPT | Performed by: NURSE PRACTITIONER

## 2025-02-20 PROCEDURE — 83036 HEMOGLOBIN GLYCOSYLATED A1C: CPT | Performed by: NURSE PRACTITIONER

## 2025-02-20 NOTE — PROGRESS NOTES
deficiency    Was On Vit D replacement - advised ot resume  Counseled on the importance of vitamin D and bone health       I personally spent  30 minutes reviewing  external notes from PCP and other patient's care team providers, and personally interpreted labs associated with the above diagnosis. I also ordered labs to further assess and manage the above addressed medical conditions.     Return in about 4 months (around 6/20/2025) for type 2 DM.    The above issues were reviewed with the patient who understood and agreed with the plan.    Thank you for allowing us to participate in the care of this patient. Please do not hesitate to contact us with any additional questions.     KYARA Lincoln NP    [place] [facility]   [address] [facility], [address], [place] [address]  Phone: [phone]  Fax: [phone]  --------------------------------------------  An electronic signature was used to authenticate this note. KYARA Lincoln NP on 2/20/2025 at 10:11 AM

## 2025-02-26 DIAGNOSIS — E11.9 TYPE 2 DIABETES MELLITUS WITHOUT COMPLICATION, WITHOUT LONG-TERM CURRENT USE OF INSULIN (HCC): ICD-10-CM

## 2025-02-26 RX ORDER — ORAL SEMAGLUTIDE 7 MG/1
TABLET ORAL
Qty: 90 TABLET | Refills: 1 | Status: SHIPPED | OUTPATIENT
Start: 2025-02-26

## 2025-06-25 ENCOUNTER — OFFICE VISIT (OUTPATIENT)
Dept: ENDOCRINOLOGY | Age: 55
End: 2025-06-25
Payer: COMMERCIAL

## 2025-06-25 VITALS
HEART RATE: 67 BPM | TEMPERATURE: 97.7 F | RESPIRATION RATE: 18 BRPM | BODY MASS INDEX: 35.77 KG/M2 | SYSTOLIC BLOOD PRESSURE: 107 MMHG | WEIGHT: 236 LBS | OXYGEN SATURATION: 95 % | HEIGHT: 68 IN | DIASTOLIC BLOOD PRESSURE: 65 MMHG

## 2025-06-25 DIAGNOSIS — E11.9 TYPE 2 DIABETES MELLITUS WITHOUT COMPLICATION, WITHOUT LONG-TERM CURRENT USE OF INSULIN (HCC): Primary | ICD-10-CM

## 2025-06-25 DIAGNOSIS — E55.9 VITAMIN D DEFICIENCY: ICD-10-CM

## 2025-06-25 DIAGNOSIS — E66.811 CLASS 1 OBESITY DUE TO EXCESS CALORIES WITH SERIOUS COMORBIDITY AND BODY MASS INDEX (BMI) OF 34.0 TO 34.9 IN ADULT: ICD-10-CM

## 2025-06-25 DIAGNOSIS — E66.09 CLASS 1 OBESITY DUE TO EXCESS CALORIES WITH SERIOUS COMORBIDITY AND BODY MASS INDEX (BMI) OF 34.0 TO 34.9 IN ADULT: ICD-10-CM

## 2025-06-25 DIAGNOSIS — E78.2 MIXED HYPERLIPIDEMIA: ICD-10-CM

## 2025-06-25 LAB — HBA1C MFR BLD: 6.6 %

## 2025-06-25 PROCEDURE — 99214 OFFICE O/P EST MOD 30 MIN: CPT | Performed by: NURSE PRACTITIONER

## 2025-06-25 PROCEDURE — 3044F HG A1C LEVEL LT 7.0%: CPT | Performed by: NURSE PRACTITIONER

## 2025-06-25 PROCEDURE — 83036 HEMOGLOBIN GLYCOSYLATED A1C: CPT | Performed by: NURSE PRACTITIONER

## 2025-06-25 RX ORDER — ORAL SEMAGLUTIDE 7 MG/1
TABLET ORAL
Qty: 90 TABLET | Refills: 3 | Status: SHIPPED | OUTPATIENT
Start: 2025-06-25

## 2025-06-25 NOTE — PROGRESS NOTES
hyperlipidemia   Continue atorvastatin.    Counseled on the importance of statin therapy with diabetes  Last lipids stable     4. Vitamin D deficiency    Was On Vit D replacement - advised ot resume  Counseled on the importance of vitamin D and bone health       I personally spent  30 minutes reviewing  external notes from PCP and other patient's care team providers, and personally interpreted labs associated with the above diagnosis. I also ordered labs to further assess and manage the above addressed medical conditions.     Return in about 4 months (around 10/25/2025) for Type2 DM .    The above issues were reviewed with the patient who understood and agreed with the plan.    Thank you for allowing us to participate in the care of this patient. Please do not hesitate to contact us with any additional questions.     KYARA Lincoln NP    [place] [facility]   [address] [place] [address], [address], [place] [id number]  Phone: [phone]  Fax: [phone]  --------------------------------------------  An electronic signature was used to authenticate this note. KYARA Lincoln NP on 6/25/2025 at 9:53 AM

## 2025-06-30 DIAGNOSIS — E11.9 TYPE 2 DIABETES MELLITUS WITHOUT COMPLICATION, WITHOUT LONG-TERM CURRENT USE OF INSULIN (HCC): ICD-10-CM

## 2025-07-01 RX ORDER — EMPAGLIFLOZIN 25 MG/1
25 TABLET, FILM COATED ORAL DAILY
Qty: 90 TABLET | Refills: 3 | OUTPATIENT
Start: 2025-07-01